# Patient Record
Sex: FEMALE | Race: WHITE | NOT HISPANIC OR LATINO | Employment: UNEMPLOYED | URBAN - NONMETROPOLITAN AREA
[De-identification: names, ages, dates, MRNs, and addresses within clinical notes are randomized per-mention and may not be internally consistent; named-entity substitution may affect disease eponyms.]

---

## 2025-02-26 ENCOUNTER — OFFICE VISIT (OUTPATIENT)
Dept: URGENT CARE | Facility: CLINIC | Age: 40
End: 2025-02-26

## 2025-02-26 VITALS
SYSTOLIC BLOOD PRESSURE: 100 MMHG | TEMPERATURE: 98 F | DIASTOLIC BLOOD PRESSURE: 70 MMHG | OXYGEN SATURATION: 100 % | RESPIRATION RATE: 18 BRPM | HEART RATE: 62 BPM

## 2025-02-26 DIAGNOSIS — R59.0 ENLARGED LYMPH NODE IN NECK: Primary | ICD-10-CM

## 2025-02-26 PROCEDURE — 99203 OFFICE O/P NEW LOW 30 MIN: CPT | Performed by: FAMILY MEDICINE

## 2025-02-26 NOTE — PATIENT INSTRUCTIONS
Patient directed to go to the emergency room for higher level of care.  They have chosen that they will go to [facility] here in [place].    Follow up with PCP in 3-5 days.  Proceed to  ER if symptoms worsen.

## 2025-02-26 NOTE — PROGRESS NOTES
[facility]        NAME: Malena Garibay is a 39 y.o. female  : 1985    MRN: [id number]  DATE: 2025  TIME: 10:33 AM    Assessment and Plan   Enlarged lymph node in neck [R59.0]  1. Enlarged lymph node in neck          Suspicious for a more serious pathology such as lymphoma.    Patient Instructions     Patient directed to go to the emergency room for higher level of care.  They have chosen that they will go to [facility] here in [place].    Follow up with PCP in 3-5 days.  Proceed to  ER if symptoms worsen.    If tests have been performed at [facility], our office will contact you with results if changes need to be made to the care plan discussed with you at the visit.  [facility]    Chief Complaint     Chief Complaint   Patient presents with    Mass     Pt c/o lump on neck for 2-3 months, and realized now that its growing. Painful         History of Present Illness       Mass (Pt c/o lump on neck for 2-3 months, and realized now that its growing. Painful)  Patient does not have a PCP.  She noticed this lump about 3 months ago and has been slowly growing since then.  It is painful to palpation.  She also admits to night sweats, fatigue, and weight gain.  Denies chest pain or shortness of breath    Review of Systems   Review of Systems   Constitutional:  Positive for fatigue and unexpected weight change.   HENT: Negative.     Respiratory: Negative.     Cardiovascular: Negative.        Current Medications     No current outpatient medications on file.    Current Allergies     Allergies as of 2025    (No Known Allergies)            The following portions of the patient's history were reviewed and updated as appropriate: allergies, current medications, past family history, past medical history, past social history, past surgical history and problem list.     History reviewed. No pertinent past medical history.    History reviewed.  No pertinent surgical history.    History reviewed. No pertinent family history.      Medications have been verified.        Objective   /70   Pulse 62   Temp 98 °F (36.7 °C) (Temporal)   Resp 18   LMP 02/02/2025 (Exact Date)   SpO2 100%   Patient's last menstrual period was 02/02/2025 (exact date).       Physical Exam     Physical Exam  Vitals and nursing note reviewed.   Constitutional:       General: She is not in acute distress.     Appearance: Normal appearance. She is well-developed.   HENT:      Right Ear: Tympanic membrane and external ear normal.      Left Ear: Tympanic membrane and external ear normal.      Nose: Nose normal.      Mouth/Throat:      Mouth: Mucous membranes are moist.      Pharynx: No oropharyngeal exudate.   Eyes:      Conjunctiva/sclera: Conjunctivae normal.   Cardiovascular:      Rate and Rhythm: Normal rate and regular rhythm.      Heart sounds: Normal heart sounds. No murmur heard.  Pulmonary:      Effort: Pulmonary effort is normal. No respiratory distress.      Breath sounds: Normal breath sounds. No wheezing or rales.   Chest:      Chest wall: No tenderness.   Musculoskeletal:         General: Normal range of motion.      Cervical back: Normal range of motion and neck supple.   Lymphadenopathy:      Cervical: Cervical adenopathy present.      Left cervical: Superficial cervical adenopathy present.      Comments: Left cervical lymph node enlarged, measuring approximately 2 cm x 4 cm.   Skin:     General: Skin is warm.      Findings: No erythema or rash.   Neurological:      Mental Status: She is alert and oriented to person, place, and time.